# Patient Record
Sex: MALE | NOT HISPANIC OR LATINO | Employment: STUDENT | URBAN - METROPOLITAN AREA
[De-identification: names, ages, dates, MRNs, and addresses within clinical notes are randomized per-mention and may not be internally consistent; named-entity substitution may affect disease eponyms.]

---

## 2023-03-01 ENCOUNTER — HOSPITAL ENCOUNTER (OUTPATIENT)
Facility: AMBULATORY SURGERY CENTER | Age: 28
End: 2023-03-01
Attending: INTERNAL MEDICINE
Payer: COMMERCIAL

## 2023-03-01 ENCOUNTER — TELEPHONE (OUTPATIENT)
Dept: GASTROENTEROLOGY | Facility: CLINIC | Age: 28
End: 2023-03-01
Payer: COMMERCIAL

## 2023-03-01 ENCOUNTER — TRANSCRIBE ORDERS (OUTPATIENT)
Dept: OTHER | Age: 28
End: 2023-03-01

## 2023-03-01 DIAGNOSIS — K63.5 COLON POLYP: Primary | ICD-10-CM

## 2023-03-01 NOTE — TELEPHONE ENCOUNTER
Screening Questions  BLUE  KIND OF PREP RED  LOCATION [review exclusion criteria] GREEN  SEDATION TYPE        Y Are you active on mychart?       Amarilis Nieto   Ordering/Referring Provider?        [employer] What type of coverage do you have?      N Have you had a positive covid test in the last 14 days?     26.3 1. BMI  [BMI 40+ - review exclusion criteria]    Y  2. Are you able to give consent for your medical care? [IF NO,RN REVIEW]          N  3. Are you taking any prescription pain medications on a routine schedule   (ex narcotics: oxycodone, roxicodone, oxycontin,  and percocet)? [RN Review]          3a. EXTENDED PREP What kind of prescription?     N 4. Do you have any chemical dependencies such as alcohol, street drugs, or methadone?        **If yes 3- 5 , please schedule with MAC sedation.**          IF YES TO ANY 6 - 10 - HOSPITAL SETTING ONLY.     N 6.   Do you need assistance transferring?     N 7.   Have you had a heart or lung transplant?    N 8.   Are you currently on dialysis?   N 9.   Do you use daily home oxygen?   N 10. Do you take nitroglycerin?   10a.  If yes, how often?     11. [FEMALES]  N/Z Are you currently pregnant?    11a.  If yes, how many weeks? [ Greater than 12 weeks, OR NEEDED]    N 12. Do you have Pulmonary Hypertension? *NEED PAC APPT AT [facility] w/ MAC*     N 13. [review exclusion criteria]  Do you have any implantable devices in your body (pacemaker, defib, LVAD)?    N 14. In the past 6 months, have you had any heart related issues including cardiomyopathy or heart attack?     14a.  If yes, did it require cardiac stenting if so when?     N 15. Have you had a stroke or Transient ischemic attack (TIA - aka  mini stroke ) within 6 months?      N 16. Do you have mod to severe Obstructive Sleep Apnea?  [Hospital only]    N 17. Do you have SEVERE AND UNCONTROLLED asthma? *NEED PAC APPT AT [facility] w/MAC*     18. Are you currently taking any blood thinners?     18a. No. Continue to  "19.   18b.     N 19. Do you take the medication Phentermine?    19a. If yes, \"Hold for 7 days before procedure.  Please consult your prescribing provider if you have questions about holding this medication.\"     N  20. Do you have chronic kidney disease?      N  21. Do you have a diagnosis of diabetes?     N  22. On a regular basis do you go 3-5 days between bowel movements?      23. Preferred LOCAL Pharmacy for Pre Prescription    [ LIST ONLY ONE PHARMACY]       [facility] - [address] [place] [phone]      - CLOSING REMINDERS -    Informed patient they will need an adult    Cannot take any type of public or medical transportation alone    Conscious Sedation- Needs  for 6 hours after the procedure       MAC/General-Needs  for 24 hours after procedure    Pre-Procedure Covid test to be completed [[facility] PCR Testing Required]    Confirmed Nurse will call to complete assessment       - SCHEDULING DETAILS -  NO Hospital Setting Required? If yes, what is the exclusion?:    VISKOCIL  Surgeon    5/2  Date of Procedure  Lower Endoscopy [Colonoscopy]  Type of Procedure Scheduled  [facility] [place] [facility]   [facility]-If you answer yes to questions #8, #20, #21Which Colonoscopy Prep was Sent?     MAC, OUTSIDE ORDER Sedation Type     N PAC / Pre-op Required         Referral is external order, should be scheduled with MAC. Patient states that he is supposed to have an EGD and Colonoscopy completed, we only have referral for Colonoscopy. For now only Colonoscopy scheduled for 60mins to accommodate EGD. Instructed patient to connect with ordering provider for EGD referral. Informed patient that if EGD reafferal is not received only Colonoscopy will be completed. Patient expressed understanding.        "

## 2023-03-06 ENCOUNTER — TRANSCRIBE ORDERS (OUTPATIENT)
Dept: OTHER | Age: 28
End: 2023-03-06

## 2023-03-06 ENCOUNTER — TELEPHONE (OUTPATIENT)
Dept: GASTROENTEROLOGY | Facility: CLINIC | Age: 28
End: 2023-03-06
Payer: COMMERCIAL

## 2023-03-06 DIAGNOSIS — K21.9 GASTROESOPHAGEAL REFLUX DISEASE WITHOUT ESOPHAGITIS: Primary | ICD-10-CM

## 2023-03-06 NOTE — TELEPHONE ENCOUNTER
Screening questions done 3/1/23 at the time of the colonoscopy scheduling.  Just adding EGD to the colonoscopy that is already scheduled    - SCHEDULING DETAILS -  N Hospital Setting Required? If yes, what is the exclusion?: LIZETTE Henao  Surgeon    5/2/23  Date of Procedure  Upper and Lower Endoscopy [EGD and Colonoscopy]  Type of Procedure Scheduled  [facility]-If you answer yes to questions #8, #20, #21Which Colonoscopy Prep was Sent?     MAC Sedation Type     NO PAC / Pre-op Required

## 2023-04-04 ENCOUNTER — TELEPHONE (OUTPATIENT)
Dept: GASTROENTEROLOGY | Facility: CLINIC | Age: 28
End: 2023-04-04

## 2023-04-04 ENCOUNTER — TELEPHONE (OUTPATIENT)
Dept: GASTROENTEROLOGY | Facility: CLINIC | Age: 28
End: 2023-04-04
Payer: COMMERCIAL

## 2023-04-04 NOTE — TELEPHONE ENCOUNTER
Caller: Adriel Garcia    Reason for Reschedule/Cancellation (please be detailed, any staff messages or encounters to note?): Patient has finals on original scheduled date, reached out to reschedule for sooner date.      Prior to reschedule please review:    Ordering Provider:Amarilis Nieto    Sedation per order:MAC    Does patient have any ASC Exclusions, please identify?: NO      Notes on Cancelled Procedure:    Procedure:Upper and Lower Endoscopy [EGD and Colonoscopy]     Date: 5/2    [facility] [address] [place] [address], [address] Floor, [place] [address]    Surgeon: JOHANNA        Rescheduled: Yes    Procedure: Upper and Lower Endoscopy [EGD and Colonoscopy]     Date: 4/11    Location:[facility]; [address] [place] [address], [address]nd Floor, [place] [address]    Surgeon: GEORGE    Sedation Level Scheduled  MAC,  Reason for Sedation Level PER ORDER    Prep/Instructions updated and sent: LETTER

## 2023-04-04 NOTE — TELEPHONE ENCOUNTER
Called the Pt to discuss below. No answer, LM.     Patient scheduled for Colonoscopy/Upper endoscopy (EGD) on 4/11/23.     Discuss Covid policy.     Pre op exam needed? N/A    Arrival time: 1315. Procedure time 1400    Facility location: [facility]; [address], [place] [address]    Sedation type: MAC    Anticoagulations? No -- verify as we have limited information in chart    Electronic implanted devices? No    Diabetic? No    Indication for procedure: GERD, previous polyps     Bowel prep recommendation: Miralax prep without magnesium citrate    Prep instructions sent via Letter -- will check with the Pt is email option is ok as well.     Pre visit planning completed.    Landy Martinez RN  Endoscopy Procedure Pre Assessment RN

## 2023-04-10 ENCOUNTER — ANESTHESIA EVENT (OUTPATIENT)
Dept: SURGERY | Facility: AMBULATORY SURGERY CENTER | Age: 28
End: 2023-04-10
Payer: COMMERCIAL

## 2023-04-11 ENCOUNTER — HOSPITAL ENCOUNTER (OUTPATIENT)
Facility: AMBULATORY SURGERY CENTER | Age: 28
Discharge: HOME | End: 2023-04-11
Attending: INTERNAL MEDICINE
Payer: COMMERCIAL

## 2023-04-11 ENCOUNTER — ANESTHESIA (OUTPATIENT)
Dept: SURGERY | Facility: AMBULATORY SURGERY CENTER | Age: 28
End: 2023-04-11
Payer: COMMERCIAL

## 2023-04-11 VITALS
DIASTOLIC BLOOD PRESSURE: 88 MMHG | TEMPERATURE: 97.1 F | SYSTOLIC BLOOD PRESSURE: 158 MMHG | OXYGEN SATURATION: 99 % | RESPIRATION RATE: 16 BRPM

## 2023-04-11 VITALS — HEART RATE: 71 BPM

## 2023-04-11 LAB
COLONOSCOPY: NORMAL
UPPER GI ENDOSCOPY: NORMAL

## 2023-04-11 PROCEDURE — G8907 PT DOC NO EVENTS ON DISCHARG: HCPCS

## 2023-04-11 PROCEDURE — 45378 DIAGNOSTIC COLONOSCOPY: CPT

## 2023-04-11 PROCEDURE — G8918 PT W/O PREOP ORDER IV AB PRO: HCPCS

## 2023-04-11 PROCEDURE — 88305 TISSUE EXAM BY PATHOLOGIST: CPT | Performed by: PATHOLOGY

## 2023-04-11 PROCEDURE — 43239 EGD BIOPSY SINGLE/MULTIPLE: CPT

## 2023-04-11 RX ORDER — ONDANSETRON 2 MG/ML
4 INJECTION INTRAMUSCULAR; INTRAVENOUS EVERY 6 HOURS PRN
Status: DISCONTINUED | OUTPATIENT
Start: 2023-04-11 | End: 2023-04-12 | Stop reason: HOSPADM

## 2023-04-11 RX ORDER — NALOXONE HYDROCHLORIDE 0.4 MG/ML
0.2 INJECTION, SOLUTION INTRAMUSCULAR; INTRAVENOUS; SUBCUTANEOUS
Status: DISCONTINUED | OUTPATIENT
Start: 2023-04-11 | End: 2023-04-12 | Stop reason: HOSPADM

## 2023-04-11 RX ORDER — PROPOFOL 10 MG/ML
INJECTION, EMULSION INTRAVENOUS CONTINUOUS PRN
Status: DISCONTINUED | OUTPATIENT
Start: 2023-04-11 | End: 2023-04-11

## 2023-04-11 RX ORDER — ONDANSETRON 2 MG/ML
4 INJECTION INTRAMUSCULAR; INTRAVENOUS
Status: DISCONTINUED | OUTPATIENT
Start: 2023-04-11 | End: 2023-04-12 | Stop reason: HOSPADM

## 2023-04-11 RX ORDER — SODIUM CHLORIDE, SODIUM LACTATE, POTASSIUM CHLORIDE, CALCIUM CHLORIDE 600; 310; 30; 20 MG/100ML; MG/100ML; MG/100ML; MG/100ML
INJECTION, SOLUTION INTRAVENOUS CONTINUOUS PRN
Status: DISCONTINUED | OUTPATIENT
Start: 2023-04-11 | End: 2023-04-11

## 2023-04-11 RX ORDER — ONDANSETRON 4 MG/1
4 TABLET, ORALLY DISINTEGRATING ORAL EVERY 6 HOURS PRN
Status: DISCONTINUED | OUTPATIENT
Start: 2023-04-11 | End: 2023-04-12 | Stop reason: HOSPADM

## 2023-04-11 RX ORDER — FLUMAZENIL 0.1 MG/ML
0.2 INJECTION, SOLUTION INTRAVENOUS
Status: DISCONTINUED | OUTPATIENT
Start: 2023-04-11 | End: 2023-04-12 | Stop reason: HOSPADM

## 2023-04-11 RX ORDER — PROCHLORPERAZINE MALEATE 10 MG
10 TABLET ORAL EVERY 6 HOURS PRN
Status: DISCONTINUED | OUTPATIENT
Start: 2023-04-11 | End: 2023-04-12 | Stop reason: HOSPADM

## 2023-04-11 RX ORDER — LIDOCAINE 40 MG/G
CREAM TOPICAL
Status: DISCONTINUED | OUTPATIENT
Start: 2023-04-11 | End: 2023-04-12 | Stop reason: HOSPADM

## 2023-04-11 RX ORDER — NALOXONE HYDROCHLORIDE 0.4 MG/ML
0.4 INJECTION, SOLUTION INTRAMUSCULAR; INTRAVENOUS; SUBCUTANEOUS
Status: DISCONTINUED | OUTPATIENT
Start: 2023-04-11 | End: 2023-04-12 | Stop reason: HOSPADM

## 2023-04-11 RX ORDER — LIDOCAINE HYDROCHLORIDE 20 MG/ML
INJECTION, SOLUTION INFILTRATION; PERINEURAL PRN
Status: DISCONTINUED | OUTPATIENT
Start: 2023-04-11 | End: 2023-04-11

## 2023-04-11 RX ADMIN — PROPOFOL 150 MCG/KG/MIN: 10 INJECTION, EMULSION INTRAVENOUS at 15:08

## 2023-04-11 RX ADMIN — PROPOFOL 100 MG: 10 INJECTION, EMULSION INTRAVENOUS at 15:07

## 2023-04-11 RX ADMIN — SODIUM CHLORIDE, SODIUM LACTATE, POTASSIUM CHLORIDE, CALCIUM CHLORIDE: 600; 310; 30; 20 INJECTION, SOLUTION INTRAVENOUS at 14:59

## 2023-04-11 RX ADMIN — LIDOCAINE HYDROCHLORIDE 80 MG: 20 INJECTION, SOLUTION INFILTRATION; PERINEURAL at 15:08

## 2023-04-11 RX ADMIN — PROPOFOL 50 MG: 10 INJECTION, EMULSION INTRAVENOUS at 15:11

## 2023-04-11 NOTE — ANESTHESIA PREPROCEDURE EVALUATION
Anesthesia Pre-Procedure Evaluation    Patient: Adriel Garcia   MRN: [id number] : 1995        Procedure : Procedure(s):  Combined Esophagoscopy, Gastroscopy, Duodenoscopy (Egd) With Co2 Insufflation  Colonoscopy with CO2 insufflation          No past medical history on file.   No past surgical history on file.   Not on File   Social History     Tobacco Use     Smoking status: Not on file     Smokeless tobacco: Not on file   Substance Use Topics     Alcohol use: Not on file      Wt Readings from Last 1 Encounters:   No data found for Wt        Anesthesia Evaluation            ROS/MED HX  ENT/Pulmonary:  - neg pulmonary ROS     Neurologic:  - neg neurologic ROS     Cardiovascular:  - neg cardiovascular ROS  (-) murmur   METS/Exercise Tolerance: >4 METS    Hematologic:  - neg hematologic  ROS     Musculoskeletal:  - neg musculoskeletal ROS     GI/Hepatic:     (+) GERD, Symptomatic, bowel prep,     Renal/Genitourinary:  - neg Renal ROS     Endo:  - neg endo ROS     Psychiatric/Substance Use:  - neg psychiatric ROS     Infectious Disease:  - neg infectious disease ROS     Malignancy:  - neg malignancy ROS     Other:  - neg other ROS          Physical Exam    Airway        Mallampati: I   TM distance: > 3 FB   Neck ROM: full   Mouth opening: > 3 cm    Respiratory Devices and Support         Dental     Comment: Teeth examined without any significant abnormality, patient denies loose, missing or chipped teeth or anything removable.         Cardiovascular          Rhythm and rate: regular and normal (-) no murmur    Pulmonary   pulmonary exam normal        breath sounds clear to auscultation           OUTSIDE LABS:  CBC: No results found for: WBC, HGB, HCT, PLT  BMP: No results found for: NA, POTASSIUM, CHLORIDE, CO2, BUN, CR, GLC  COAGS: No results found for: PTT, INR, FIBR  POC: No results found for: BGM, HCG, HCGS  HEPATIC: No results found for: ALBUMIN, PROTTOTAL, ALT, AST, GGT, ALKPHOS, BILITOTAL, BILIDIRECT,  MARK  OTHER: No results found for: PH, LACT, A1C, RADHA, PHOS, MAG, LIPASE, AMYLASE, TSH, T4, T3, CRP, SED    Anesthesia Plan    ASA Status:  2   NPO Status:  NPO Appropriate    Anesthesia Type: MAC.     - Reason for MAC: immobility needed   Induction: Intravenous, Propofol.   Maintenance: TIVA.        Consents    Anesthesia Plan(s) and associated risks, benefits, and realistic alternatives discussed. Questions answered and patient/representative(s) expressed understanding.    - Discussed:     - Discussed with:  Patient      - Extended Intubation/Ventilatory Support Discussed: No.      - Patient is DNR/DNI Status: No    Use of blood products discussed: No .     Postoperative Care    Pain management: IV analgesics.   PONV prophylaxis: Ondansetron (or other 5HT-3), Background Propofol Infusion     Comments:    Other Comments: Discussed plan for IV anesthetic with native airway. Discussed potential need for conversion to general anesthetic with airway management and potential for recall.  Appropriately NPO, discussed with GERD symptoms that aspiration risk is increased with any anesthetic but still should be low risk overall.             Bethel Reynolds MD

## 2023-04-11 NOTE — H&P
[facility] Anesthesia Pre-op History and Physical    Adriel Garcia MRN# [id number]   Age: 27 year old YOB: 1995            Date of Exam 4/11/2023           Primary care provider: Cece Porter         Chief Complaint and/or Reason for Procedure:     GERD, dysphagia, history of colon polyps         Active problem list:     There are no problems to display for this patient.           Medications (include herbals and vitamins):   Any Plavix use in the last 7 days? No     No current outpatient medications on file.     Current Facility-Administered Medications   Medication     lidocaine (LMX4) kit     lidocaine 1 % 0.1-1 mL     ondansetron (ZOFRAN) injection 4 mg     sodium chloride (PF) 0.9% PF flush 3 mL     sodium chloride (PF) 0.9% PF flush 3 mL     Facility-Administered Medications Ordered in Other Encounters   Medication     lactated ringers infusion             Allergies:    No Known Allergies  Allergy to Latex? No  Allergy to tape?   No  Intolerances:             Physical Exam:   All vitals have been reviewed  Patient Vitals for the past 8 hrs:   BP Temp Temp src Resp SpO2   04/11/23 1331 (!) 148/89 97.1  F (36.2  C) Temporal 16 99 %     No intake/output data recorded.  Lungs:   No increased work of breathing, good air exchange, clear to auscultation bilaterally, no crackles or wheezing     Cardiovascular:   normal S1 and S2             Lab / Radiology Results:            Anesthetic risk and/or ASA classification:     2  Lynn Machuca DO

## 2023-04-11 NOTE — ANESTHESIA POSTPROCEDURE EVALUATION
Patient: Adriel Garcia    Procedure: Procedure(s):  Combined Esophagoscopy, Gastroscopy, Duodenoscopy (Egd) With Co2 Insufflation  Colonoscopy with CO2 insufflation  ESOPHAGOGASTRODUODENOSCOPY, WITH BIOPSY       Anesthesia Type:  MAC    Note:  Disposition: Outpatient   Postop Pain Control: Uneventful            Sign Out: Well controlled pain   PONV: No   Neuro/Psych: Uneventful            Sign Out: Acceptable/Baseline neuro status   Airway/Respiratory: Uneventful            Sign Out: Acceptable/Baseline resp. status   CV/Hemodynamics: Uneventful            Sign Out: Acceptable CV status; No obvious hypovolemia; No obvious fluid overload   Other NRE:    DID A NON-ROUTINE EVENT OCCUR? No           Last vitals:  Vitals Value Taken Time   /73 04/11/23 1548   Temp     Pulse     Resp 16 04/11/23 1548   SpO2 100 % 04/11/23 1548       Electronically Signed By: Bethel Reynolds MD  April 11, 2023  4:03 PM

## 2023-04-11 NOTE — ANESTHESIA CARE TRANSFER NOTE
Patient: Adriel Garcia    Procedure: Procedure(s):  Combined Esophagoscopy, Gastroscopy, Duodenoscopy (Egd) With Co2 Insufflation  Colonoscopy with CO2 insufflation  ESOPHAGOGASTRODUODENOSCOPY, WITH BIOPSY       Diagnosis: Colon polyp [K63.5]  Gastroesophageal reflux disease without esophagitis [K21.9]  Diagnosis Additional Information: No value filed.    Anesthesia Type:   MAC     Note:    Oropharynx: oropharynx clear of all foreign objects and spontaneously breathing  Level of Consciousness: drowsy  Oxygen Supplementation: room air    Independent Airway: airway patency satisfactory and stable  Dentition: dentition unchanged  Vital Signs Stable: post-procedure vital signs reviewed and stable  Report to RN Given: handoff report given  Patient transferred to: Phase II    Handoff Report: Identifed the Patient, Identified the Reponsible Provider, Reviewed the pertinent medical history, Discussed the surgical course, Reviewed Intra-OP anesthesia mangement and issues during anesthesia, Set expectations for post-procedure period and Allowed opportunity for questions and acknowledgement of understanding      Vitals:  Vitals Value Taken Time   BP     Temp     Pulse 71 04/11/23 1544   Resp     SpO2         Electronically Signed By: LUIS Marin CRNA  April 11, 2023  3:46 PM

## 2023-04-13 LAB
PATH REPORT.COMMENTS IMP SPEC: NORMAL
PATH REPORT.COMMENTS IMP SPEC: NORMAL
PATH REPORT.FINAL DX SPEC: NORMAL
PATH REPORT.GROSS SPEC: NORMAL
PATH REPORT.MICROSCOPIC SPEC OTHER STN: NORMAL
PATH REPORT.RELEVANT HX SPEC: NORMAL
PHOTO IMAGE: NORMAL

## 2023-05-21 ENCOUNTER — HEALTH MAINTENANCE LETTER (OUTPATIENT)
Age: 28
End: 2023-05-21

## 2024-02-22 ENCOUNTER — MEDICAL CORRESPONDENCE (OUTPATIENT)
Dept: SCHEDULING | Facility: CLINIC | Age: 29
End: 2024-02-22
Payer: COMMERCIAL

## 2024-02-22 ENCOUNTER — ANCILLARY ORDERS (OUTPATIENT)
Dept: CARDIOLOGY | Facility: CLINIC | Age: 29
End: 2024-02-22
Payer: COMMERCIAL

## 2024-02-22 ENCOUNTER — TRANSCRIBE ORDERS (OUTPATIENT)
Dept: OTHER | Age: 29
End: 2024-02-22

## 2024-02-22 DIAGNOSIS — R00.2 PALPITATIONS: Primary | ICD-10-CM

## 2024-02-22 DIAGNOSIS — D48.5 NEOPLASM OF UNCERTAIN BEHAVIOR OF SKIN: Primary | ICD-10-CM

## 2024-07-28 ENCOUNTER — HEALTH MAINTENANCE LETTER (OUTPATIENT)
Age: 29
End: 2024-07-28

## 2024-09-17 ENCOUNTER — TRANSFERRED RECORDS (OUTPATIENT)
Dept: HEALTH INFORMATION MANAGEMENT | Facility: CLINIC | Age: 29
End: 2024-09-17

## 2024-09-18 ENCOUNTER — TRANSFERRED RECORDS (OUTPATIENT)
Dept: HEALTH INFORMATION MANAGEMENT | Facility: CLINIC | Age: 29
End: 2024-09-18

## 2024-09-18 ENCOUNTER — TRANSCRIBE ORDERS (OUTPATIENT)
Dept: OTHER | Age: 29
End: 2024-09-18

## 2024-09-18 DIAGNOSIS — R25.3 MUSCLE TWITCHING: Primary | ICD-10-CM

## 2024-10-16 ENCOUNTER — ANCILLARY PROCEDURE (OUTPATIENT)
Dept: MRI IMAGING | Facility: CLINIC | Age: 29
End: 2024-10-16
Attending: FAMILY MEDICINE
Payer: COMMERCIAL

## 2024-10-16 DIAGNOSIS — M25.561 CHRONIC PAIN OF RIGHT KNEE: ICD-10-CM

## 2024-10-16 DIAGNOSIS — G89.29 CHRONIC PAIN OF RIGHT KNEE: ICD-10-CM

## 2024-10-16 PROCEDURE — 73721 MRI JNT OF LWR EXTRE W/O DYE: CPT | Mod: RT | Performed by: RADIOLOGY

## 2024-10-17 ENCOUNTER — TRANSCRIBE ORDERS (OUTPATIENT)
Dept: OTHER | Age: 29
End: 2024-10-17

## 2024-10-17 DIAGNOSIS — S83.241A TEAR OF MEDIAL MENISCUS OF RIGHT KNEE: Primary | ICD-10-CM

## 2024-10-28 ENCOUNTER — OFFICE VISIT (OUTPATIENT)
Dept: DERMATOLOGY | Facility: CLINIC | Age: 29
End: 2024-10-28
Attending: FAMILY MEDICINE
Payer: COMMERCIAL

## 2024-10-28 DIAGNOSIS — L21.9 DERMATITIS, SEBORRHEIC: ICD-10-CM

## 2024-10-28 DIAGNOSIS — L81.4 LENTIGINES: ICD-10-CM

## 2024-10-28 DIAGNOSIS — D22.9 MULTIPLE NEVI: ICD-10-CM

## 2024-10-28 DIAGNOSIS — L20.84 INTRINSIC ATOPIC DERMATITIS: ICD-10-CM

## 2024-10-28 DIAGNOSIS — Z12.83 ENCOUNTER FOR SCREENING FOR MALIGNANT NEOPLASM OF SKIN: Primary | ICD-10-CM

## 2024-10-28 PROCEDURE — 99204 OFFICE O/P NEW MOD 45 MIN: CPT | Performed by: PHYSICIAN ASSISTANT

## 2024-10-28 RX ORDER — FLUOXETINE 10 MG/1
20 CAPSULE ORAL DAILY
COMMUNITY
Start: 2024-10-23

## 2024-10-28 RX ORDER — KETOCONAZOLE 20 MG/ML
SHAMPOO, SUSPENSION TOPICAL
Qty: 100 ML | Refills: 11 | Status: SHIPPED | OUTPATIENT
Start: 2024-10-28

## 2024-10-28 RX ORDER — METHYLPHENIDATE HYDROCHLORIDE 36 MG/1
36 TABLET ORAL EVERY MORNING
COMMUNITY
Start: 2024-10-23

## 2024-10-28 RX ORDER — TRIAMCINOLONE ACETONIDE 1 MG/G
OINTMENT TOPICAL
Qty: 30 G | Refills: 4 | Status: SHIPPED | OUTPATIENT
Start: 2024-10-28

## 2024-10-28 ASSESSMENT — PAIN SCALES - GENERAL: PAINLEVEL_OUTOF10: NO PAIN (0)

## 2024-10-28 NOTE — PROGRESS NOTES
[facility] Dermatology Note  Encounter Date: Oct 28, 2024  Office Visit     Reviewed patients past medical history and pertinent chart review prior to patients visit today.     Dermatology Problem List:  # Seborrheic dermatitis  - ketoconazole 2% shampoo  # Atopic dermatitis  - triamcinolone 0.1% ointment     No personal history of skin cancer.  Family history of skin cancer, father, unknown type  ____________________________________________    Assessment & Plan:     # Seborrheic dermatitis, scalp   - Start ketoconazole 2% shampoo three times weekly. Advised to lather in the shower, waiting 5-10 minutes before rinsing.     # Atopic dermatitis  - Start triamcinolone 0.1% ointment twice daily for 2 weeks. Restart if rash flares again in the future. We reviewed potential side effects, including skin atrophy.  - Continue sensitive skin care with daily non-scented moisturizer.     # Perioral sores  - No present today. Discussed differential including acne versus cold sores vs other. I recommend photos / follow up should the lesions flare again in the future.     # Multiple nevi, trunk and extremities  # Solar lentigines  - No concerning features on dermoscopy. We discussed the importance of self exams at home. ABCDE criteria and importance of photoprotection reviewed.     Follow-up:  Annual for follow up full body skin exam, as needed for new or changing lesions or new concerns    All risks, benefits and alternatives were discussed with patient.  Patient is in agreement and understands the assessment and plan.  All questions were answered.  Giuliana Mendieta PA-C  [facility] Dermatology    ____________________________________________    CC: Derm Problem (FBSE - various spots on back)    HPI:  Mr. Adriel Garcia is a(n) 29 year old male who presents today as a return patient for a full body skin cancer screening. The patient requests evaluation of moles on his back. No growth or changes over time.  No pain, itching, or bleeding. Second, he notes a history of eczema on the hands and elbows. He would like a refill of his triamcinolone. Finally, he notes occasional sores around his mouth. He was prescribed a pill for this in the past, unsure of the name. No other specific cutaneous concerns today. The patient reports trying to be diligent with photoprotection.      Physical Exam:  Vitals: There were no vitals taken for this visit.  SKIN: Total skin excluding the genitalia areas was performed. The exam included the scalp, face, neck, bilateral arms, chest, back, abdomen, bilateral legs, digits, mons pubis, buttocks, and nails.   - Carmen II.  - Greasy yellow scale with background erythema involving the .   - Multiple tan/brown macules and papules scattered throughout exam, consistent with benign nevi. No concerning features on dermoscopy.   - Scattered tan, homogenous macules scattered on sun exposed skin, consistent with solar lentigines.     Medications:  Current Outpatient Medications   Medication Sig Dispense Refill    FLUoxetine (PROZAC) 10 MG capsule Take 20 mg by mouth daily.      methylphenidate HCl ER, OSM, (CONCERTA) 36 MG CR tablet Take 36 mg by mouth every morning.       No current facility-administered medications for this visit.      Past Medical History:   There is no problem list on file for this patient.    History reviewed. No pertinent past medical history.    CC Cece Porter MD  [address] [address] on close of this encounter.

## 2024-10-28 NOTE — NURSING NOTE
Dermatology Rooming Note    Adriel Garcia's goals for this visit include:   Chief Complaint   Patient presents with    Derm Problem     FBSE - various spots on back     Cece Aguiar, EMT

## 2024-10-28 NOTE — LETTER
10/28/2024       RE: Adriel Garcia  [address]  [place] [address]     Dear Colleague,    Thank you for referring your patient, Adriel Garcia, to the [facility] [place] at [facility]. Please see a copy of my visit note below.    [facility] Dermatology Note  Encounter Date: Oct 28, 2024  Office Visit     Reviewed patients past medical history and pertinent chart review prior to patients visit today.     Dermatology Problem List:  # Seborrheic dermatitis  - ketoconazole 2% shampoo  # Atopic dermatitis  - triamcinolone 0.1% ointment     No personal history of skin cancer.  Family history of skin cancer, father, unknown type  ____________________________________________    Assessment & Plan:     # Seborrheic dermatitis, scalp   - Start ketoconazole 2% shampoo three times weekly. Advised to lather in the shower, waiting 5-10 minutes before rinsing.     # Atopic dermatitis  - Start triamcinolone 0.1% ointment twice daily for 2 weeks. Restart if rash flares again in the future. We reviewed potential side effects, including skin atrophy.  - Continue sensitive skin care with daily non-scented moisturizer.     # Perioral sores  - No present today. Discussed differential including acne versus cold sores vs other. I recommend photos / follow up should the lesions flare again in the future.     # Multiple nevi, trunk and extremities  # Solar lentigines  - No concerning features on dermoscopy. We discussed the importance of self exams at home. ABCDE criteria and importance of photoprotection reviewed.     Follow-up:  Annual for follow up full body skin exam, as needed for new or changing lesions or new concerns    All risks, benefits and alternatives were discussed with patient.  Patient is in agreement and understands the assessment and plan.  All questions were answered.  Giuliana Mendieta PA-C  [facility]  Dermatology    ____________________________________________    CC: Derm Problem (FBSE - various spots on back)    HPI:  Mr. Adriel Garcia is a(n) 29 year old male who presents today as a return patient for a full body skin cancer screening. The patient requests evaluation of moles on his back. No growth or changes over time. No pain, itching, or bleeding. Second, he notes a history of eczema on the hands and elbows. He would like a refill of his triamcinolone. Finally, he notes occasional sores around his mouth. He was prescribed a pill for this in the past, unsure of the name. No other specific cutaneous concerns today. The patient reports trying to be diligent with photoprotection.      Physical Exam:  Vitals: There were no vitals taken for this visit.  SKIN: Total skin excluding the genitalia areas was performed. The exam included the scalp, face, neck, bilateral arms, chest, back, abdomen, bilateral legs, digits, mons pubis, buttocks, and nails.   - Carmen II.  - Greasy yellow scale with background erythema involving the .   - Multiple tan/brown macules and papules scattered throughout exam, consistent with benign nevi. No concerning features on dermoscopy.   - Scattered tan, homogenous macules scattered on sun exposed skin, consistent with solar lentigines.     Medications:  Current Outpatient Medications   Medication Sig Dispense Refill     FLUoxetine (PROZAC) 10 MG capsule Take 20 mg by mouth daily.       methylphenidate HCl ER, OSM, (CONCERTA) 36 MG CR tablet Take 36 mg by mouth every morning.       No current facility-administered medications for this visit.      Past Medical History:   There is no problem list on file for this patient.    History reviewed. No pertinent past medical history.    CC Cece Porter MD  [address] [address] on close of this encounter.      Again, thank you for allowing me to participate in the care of your patient.       Sincerely,    Giuliana Mendieta PA-C

## 2024-10-28 NOTE — PATIENT INSTRUCTIONS
Patient Education        Proper skin care from [facility]:     -Eliminate harsh soaps as they strip the natural oils from the skin, often resulting in dry itchy skin ( i.e. Dial, Zest, [place])  -Use mild soaps such as Cetaphil or Dove Sensitive Skin in the shower. You do not need to use soap on arms, legs, and trunk every time you shower unless visibly soiled.   -Avoid hot or cold showers.  -After showering, lightly dry off and apply moisturizing within 2-3 minutes. This will help trap moisture in the skin.   -Aggressive use of a moisturizer at least 1-2 times a day to the entire body (including -Vanicream, Cetaphil, Aquaphor or Cerave) and moisturize hands after every washing.  -We recommend using moisturizers that come in a tub that needs to be scooped out, not a pump. This has more of an oil base. It will hold moisture in your skin much better than a water base moisturizer. The above recommended are non-pore clogging.        Wear a sunscreen with at least SPF 30 on your face, ears, neck and V of the chest daily. Wear sunscreen on other areas of the body if those areas are exposed to the sun throughout the day. Sunscreens can contain physical and/or chemical blockers. Physical blockers are less likely to clog pores, these include zinc oxide and titanium dioxide. Reapply every two hour and after swimming.      Sunscreen examples: https://www.ewg.org/sunscreen/     UV radiation  UVA radiation remains constant throughout the day and throughout the year. It is a longer wavelength than UVB and therefore penetrates deeper into the skin leading to immediate and delayed tanning, photoaging, and skin cancer. 70-80% of UVA and UVB radiation occurs between the hours of 10am-2pm.  UVB radiation  UVB radiation causes the most harmful effects and is more significant during the summer months. However, snow and ice can reflect UVB radiation leading to skin damage during the winter months as well. UVB radiation is  responsible for tanning, burning, inflammation, delayed erythema (pinkness), pigmentation (brown spots), and skin cancer.      I recommend self monthly full body exams and yearly full body exams with a dermatology provider. If you develop a new or changing lesion please follow up for examination. Most skin cancers are pink and scaly or pink and pearly. However, we do see blue/brown/black skin cancers.  Consider the ABCDEs of melanoma when giving yourself your monthly full body exam ( don't forget the groin, buttocks, feet, toes, etc). A-asymmetry, B-borders, C-color, D-diameter, E-elevation or evolving. If you see any of these changes please follow up in clinic. If you cannot see your back I recommend purchasing a hand held mirror to use with a larger wall mirror.       Checking for Skin Cancer  You can find cancer early by checking your skin each month. There are 3 kinds of skin cancer. They are melanoma, basal cell carcinoma, and squamous cell carcinoma. Doing monthly skin checks is the best way to find new marks or skin changes. Follow the instructions below for checking your skin.   The ABCDEs of checking moles for melanoma   Check your moles or growths for signs of melanoma using ABCDE:   Asymmetry: the sides of the mole or growth don t match  Border: the edges are ragged, notched, or blurred  Color: the color within the mole or growth varies  Diameter: the mole or growth is larger than 6 mm (size of a pencil eraser)  Evolving: the size, shape, or color of the mole or growth is changing (evolving is not shown in the images below)    Checking for other types of skin cancer  Basal cell carcinoma or squamous cell carcinoma have symptoms such as:      A spot or mole that looks different from all other marks on your skin  Changes in how an area feels, such as itching, tenderness, or pain  Changes in the skin's surface, such as oozing, bleeding, or scaliness  A sore that does not heal  New swelling or redness beyond  the border of a mole     Who s at risk?  Anyone can get skin cancer. But you are at greater risk if you have:   Fair skin, light-colored hair, or light-colored eyes  Many moles or abnormal moles on your skin  A history of sunburns from sunlight or tanning beds  A family history of skin cancer  A history of exposure to radiation or chemicals  A weakened immune system  If you have had skin cancer in the past, you are at risk for recurring skin cancer.   How to check your skin  Do your monthly skin checkups in front of a full-length mirror. Check all parts of your body, including your:   Head (ears, face, neck, and scalp)  Torso (front, back, and sides)  Arms (tops, undersides, upper, and lower armpits)  Hands (palms, backs, and fingers, including under the nails)  Buttocks and genitals  Legs (front, back, and sides)  Feet (tops, soles, toes, including under the nails, and between toes)  If you have a lot of moles, take digital photos of them each month. Make sure to take photos both up close and from a distance. These can help you see if any moles change over time.   Most skin changes are not cancer. But if you see any changes in your skin, call your doctor right away. Only he or she can diagnose a problem. If you have skin cancer, seeing your doctor can be the first step toward getting the treatment that could save your life.   [employer] last reviewed this educational content on 4/1/2019 2000-2020 [employer] [address] [place] [address], [place] [address]. All rights reserved. This information is not intended as a substitute for professional medical care. Always follow your healthcare professional's instructions.        When should I call my doctor?  If you are worsening or not improving, please, contact us or seek urgent care as noted below.      Who should I call with questions (adults)?  [facility] (adult and pediatric): [phone]  [facility]  [place] (adult): [phone]  [facility] ([place], [place], [place] and [place]) [phone]  For urgent needs outside of business hours call the [facility] at [phone] and ask for the dermatology resident on call to be paged  If this is a medical emergency and you are unable to reach an ER, Call 911        If you need a prescription refill, please contact your pharmacy. Refills are approved or denied by our Physicians during normal business hours, Monday through Fridays  Per office policy, refills will not be granted if you have not been seen within the past year (or sooner depending on your child's condition)

## 2024-11-07 ENCOUNTER — TELEPHONE (OUTPATIENT)
Dept: NEUROLOGY | Facility: CLINIC | Age: 29
End: 2024-11-07
Payer: COMMERCIAL

## 2024-11-07 NOTE — TELEPHONE ENCOUNTER
Romero Morel,    We have been trying to reach you regarding your upcoming appointment with Dr. Sanchez on November 26. Unfortunately this appointment will need to be cancelled and rescheduled as the provider will not be in clinic. Please call us at [phone] to reschedule.    We apologize for the inconvenience, your appointment with  Dr. Sanchez on 11-26-24 has been cancelled and needs to be rescheduled as the provider will not be in clinic.    Thank you,  Neurosciences Scheduling Team

## 2024-12-05 NOTE — TELEPHONE ENCOUNTER
Action 12/5/24 MV 3.55pm   Action Taken Records request faxed to [facility]  Called pt to see if he has neuro recs/imaging, pt stated he saw Dr. Alanis in [facility]. Pt has recs and will attach them to [facility]       RECORDS RECEIVED FROM: external   REASON FOR VISIT: New Movement disorder  Muscle twitching, tremors    PROVIDER: Dr. Panchal   DATE OF APPT: 1/31/25   NOTES (FOR ALL VISITS) STATUS DETAILS   OFFICE NOTE from referring provider In process Dr Cece Porter @ [facility]:     OFFICE NOTE from other specialist In process Dr Isiah Alanis @ [facility] [place] [place]:     EMG In process [facility] [place] [place]     MEDICATION LIST In process    IMAGING  (FOR ALL VISITS)     MRI (HEAD, NECK, SPINE) N/A    CT (HEAD, NECK, SPINE) N/A

## 2024-12-12 NOTE — PROGRESS NOTES
Department of Neurology  Movement Disorders Division   New Patient Visit    Patient: Adriel Garcia   MRN: [id number]   : 1995   Date of Visit: 2024    CC:    HPI:  Adriel Garcia is a 29 year old male with a history of ADHD who presents as a new movement disorders consult for muscle twitching.    He presents by himself.    He notes visible and tangible twitches in his L calf around . Since then, he has noticed similar twitches throughout his body that have not gotten better. The twitches are small and not associated with movement about a joint or abnormal posturing. He became very nervous that he has ALS; has seen a neurologist and underwent an EMG that was normal - was diagnosed with benign fasciculations. He was advised to stop Concerta as there was a thought that it may have been contributing to the condition; he tried and did not feel it made any difference for the symptoms but did make his mood and concentration worse so he restarted it. Of note, his MIL passed of pancreatic cancer in summer 2024.    He has good days and bad days. He finds that coffee can increase the twitching as well as stress/anxiety. He will often move his legs voluntarily to mitigate the sensation. There is no associated pain, can feel numbness/tingling in various parts of his body without a clear pattern. They do not obviously worsen with exercise. He has been exercising less due to an injured meniscus.    He endorses a sensation of tingling and discomfort in his legs at night that is improved by moving the legs. He was started on gabapentin for presumed restless legs; he takes it inconsistently.     He sleeps relatively well. He does notice that when he is waking up in the morning, fasciculations can be more prominent.    Mood is better, was struggling with anxiety partly related to health concerns. Is now on Prozac and feels this is helpful.    He endorses a mild tremor in L>R hand for the past year or so for which  he takes propranolol. He feels it is well-controlled and is not bothered by it. He has a sister with tremor.    Denies yannick muscle weakness, balance changes, diplopia.    Denies tobacco, drinks 1-3 EtOH drinks/week, occasional CBD.    He is finishing law school, has a job accepted at [employer] after graduation.     Lives at home with his wife.     ROS:  All others negative except as listed above.    No past medical history on file.     Past Surgical History:   Procedure Laterality Date    COLONOSCOPY WITH CO2 INSUFFLATION N/A 4/11/2023    Procedure: Colonoscopy with CO2 insufflation;  Surgeon: Lynn Machuca DO;  Location: [facility] OR    COMBINED ESOPHAGOSCOPY, GASTROSCOPY, DUODENOSCOPY (EGD) WITH CO2 INSUFFLATION N/A 4/11/2023    Procedure: Combined Esophagoscopy, Gastroscopy, Duodenoscopy (Egd) With Co2 Insufflation;  Surgeon: Lynn Machuca DO;  Location: [facility] OR    ESOPHAGOSCOPY, GASTROSCOPY, DUODENOSCOPY (EGD), COMBINED N/A 4/11/2023    Procedure: ESOPHAGOGASTRODUODENOSCOPY, WITH BIOPSY;  Surgeon: Lynn Machuca DO;  Location: [facility] OR        Current Outpatient Medications   Medication Sig Dispense Refill    FLUoxetine (PROZAC) 20 MG capsule Take 20 mg by mouth daily.      gabapentin (NEURONTIN) 300 MG capsule TAKE ONE Capsule BY MOUTH NIGHTLY AS NEEDED      ketoconazole (NIZORAL) 2 % external shampoo Apply topically three times a week. Lather in the shower, wait 3-5 minutes before rinsing. 100 mL 11    methylphenidate (RITALIN) 10 MG tablet TAKE ONE Tablet by MOUTH DAILY IN THE AFTERNOON AS NEEDED FOR CONCENTRATION.      methylphenidate HCl ER, OSM, (CONCERTA) 36 MG CR tablet Take 36 mg by mouth every morning.      propranolol ER (INDERAL LA) 80 MG 24 hr capsule Take 80 mg by mouth daily.      triamcinolone (KENALOG) 0.1 % external ointment Apply to rash twice daily for 2-4 weeks, then stop. Restart if rash flares in the future. Not for groin, underarms, or face. 30 g 4       No Known Allergies  "    Family History   Problem Relation Age of Onset    Skin Cancer Father     Tremor Sister         Social History     Socioeconomic History    Marital status: Single     Spouse name: Not on file    Number of children: Not on file    Years of education: Not on file    Highest education level: Not on file   Occupational History    Not on file   Tobacco Use    Smoking status: Never    Smokeless tobacco: Not on file   Substance and Sexual Activity    Alcohol use: Not on file    Drug use: Not on file    Sexual activity: Not on file   Other Topics Concern    Not on file   Social History Narrative    Not on file     Social Drivers of Health     Financial Resource Strain: Not on file   Food Insecurity: Not on file   Transportation Needs: Not on file   Physical Activity: Not on file   Stress: Not on file   Social Connections: Not on file   Interpersonal Safety: Not on file   Housing Stability: Not on file        PHYSICAL EXAM:  /84 (BP Location: Right arm, Patient Position: Sitting, Cuff Size: Adult Large)   Pulse 75   Resp 18   Ht 1.87 m (6' 1.62\")   Wt 97.9 kg (215 lb 12.8 oz)   SpO2 100%   BMI 27.99 kg/m      Mental Status:   Alert and oriented to person, place, time, and situation.  Speech fluent without paraphasic errors. Follows commands briskly.     CN:  II: Pupils equal, round, and reactive to light. VFF.  III, IV, VI: EOM normal range, no nystagmus.  Normal saccades.  V:  Facial sensation intact.  VII: Face symmetric at rest and with activation  VIII: Intact to finger rub bilaterally.  IX, X: Palate rise b/l, uvula midline.  No dysarthria.  XI: Trapezius and SCM 5/5 bilaterally.  XII: Tongue protrudes midline. No fasciculation or atrophy noted.    Motor:    Normal bulk and tone throughout upper and lower extremities.  At least one fasciculation observed in the R calf  No rigidity or bradykinesia.  Very slight high-frequency low-amplitude postural tremor L>R hand, worse wingbeating than outstretched, " slight kinetic component, no resting tremor.    R/L  Shoulder abd      5/5  Elbow flex  5/5  Elbow ext  5/5  Wrist flex  5/5  Wrist ext  5/5  Finger abduction 5/5  Thumb abduction 5/5  Finger extension 5/5    Hip flex  5/5  Knee flex  5/5  Knee ext  5/5  Dorsiflex  5/5  Plantar flex  5/5  Eversion  5/5  Inversion  5/5    Reflexes:  R/L  Biceps   2+/2+  Triceps  2+/2+  Patellar  2+/2+  Achilles  2+/2+  No clonus.  No frontal release signs.    Sensation:    Intact to LT, vibration, temperature in all extremities.    Coordination:    FTN and HTS intact bilaterally.    Gait:    Normal stance and stride. Tandem gait intact.  Able to walk on toes and heels.  Romberg negative.        12/13/2024    10:00 AM   Tremor Motor Scale   Assessment Time 10:45   Medication On   DBS - Right Brain None   DBS - Left Brain None   Head 0   Face & Jaw 0   Voice 0   Outstretched - RIGHT 0   Outstretched - LEFT 0.5   Wingbeating - RIGHT 0   Wingbeating - LEFT 1   Kinetic - RIGHT 0   Kinetic - LEFT 1   Lower Limb - RIGHT 0   Lower Limb - LEFT 0   Lower Limb (Max) 0   Spiral - RIGHT 0   Spiral - LEFT 0   Handwriting 0   Dot approx - RIGHT 0   Dot approx - LEFT 0.5   Trunk (Standing) 0   Total Right 0   Total Left 3   Axial 0   TOTAL 3           ASSESSMENT/PLAN:  Pt presents with several months of full-body painless twitches without movement about a joint and a normal neurologic exam with normal EMG. This presentation is most consistent with benign fasciculations. At this time, there is no role for additional diagnostic testing. There are no medications that are likely to specifically improve fasciculations, so he was counseled to reduce triggers and manage anxiety as much as possible. He was counseled that there is no role for serial EMG unless he develops other symptoms that might warrant a repeat evaluation, and that this should be done with a neuromuscular specialist.    His tremor is mild and well-controlled, so there is no role for  adjusting medications at this time. He may have essential tremor vs enhanced physiologic tremor given its frequency and amplitude, though propranolol may be masking some additional tremor features. He may return to movement disorders clinic if tremor worsens.     He was counseled to keep an eye on his blood pressure and glucose in the long tem.      Return to clinic in 12mo    Patient seen and discussed with attending neurologist Dr. Kd Panchal    60 minutes spent on date of encounter doing chart reviews, obtaining history, performing physical exam, counseling, documentation, and further activities as noted above.     Kd Cazares MD  Fellow  Movement Disorders Division  [facility]    Patient seen and examined with Dr. Ureña and I agree with the assessment and plan as outlined.    Kd Panchal PhD, MD

## 2024-12-13 ENCOUNTER — OFFICE VISIT (OUTPATIENT)
Dept: NEUROLOGY | Facility: CLINIC | Age: 29
End: 2024-12-13
Attending: FAMILY MEDICINE
Payer: COMMERCIAL

## 2024-12-13 VITALS
HEART RATE: 75 BPM | RESPIRATION RATE: 18 BRPM | BODY MASS INDEX: 27.7 KG/M2 | WEIGHT: 215.8 LBS | DIASTOLIC BLOOD PRESSURE: 84 MMHG | HEIGHT: 74 IN | SYSTOLIC BLOOD PRESSURE: 137 MMHG | OXYGEN SATURATION: 100 %

## 2024-12-13 DIAGNOSIS — R25.3 MUSCLE TWITCHING: ICD-10-CM

## 2024-12-13 PROCEDURE — 99205 OFFICE O/P NEW HI 60 MIN: CPT | Mod: GC | Performed by: INTERNAL MEDICINE

## 2024-12-13 RX ORDER — METHYLPHENIDATE HYDROCHLORIDE 10 MG/1
TABLET ORAL
COMMUNITY
Start: 2024-10-23

## 2024-12-13 RX ORDER — GABAPENTIN 300 MG/1
CAPSULE ORAL
COMMUNITY
Start: 2024-09-25

## 2024-12-13 RX ORDER — PROPRANOLOL HYDROCHLORIDE 80 MG/1
80 CAPSULE, EXTENDED RELEASE ORAL DAILY
COMMUNITY
Start: 2024-09-25

## 2024-12-13 ASSESSMENT — PAIN SCALES - GENERAL: PAINLEVEL_OUTOF10: NO PAIN (0)

## 2024-12-13 NOTE — NURSING NOTE
"Chief Complaint   Patient presents with    New Patient     /84 (BP Location: Right arm, Patient Position: Sitting, Cuff Size: Adult Large)   Pulse 75   Resp 18   Ht 1.87 m (6' 1.62\")   Wt 97.9 kg (215 lb 12.8 oz)   SpO2 100%   BMI 27.99 kg/m      BERTIN WESLEY    "

## 2024-12-13 NOTE — PATIENT INSTRUCTIONS
The most likely diagnosis for your muscle twitches remains benign fasciculations.    Your neurologic exam is normal and there is no concern from our end that you have a degenerative or otherwise worrisome neurologic disorder.    This does not mean that you will never develop any neurologic conditions, but at this time we have no evidence that you are at increased risk for developing anything in particular.     The best way to manage the twitching is to reduce the triggers that make them worse. There are no good medications to specifically make the twitching better.    Your tremor is very mild, so you can continue taking propranolol.

## 2025-01-31 ENCOUNTER — PRE VISIT (OUTPATIENT)
Dept: NEUROLOGY | Facility: CLINIC | Age: 30
End: 2025-01-31

## 2025-08-10 ENCOUNTER — HEALTH MAINTENANCE LETTER (OUTPATIENT)
Age: 30
End: 2025-08-10

## (undated) DEVICE — PAD CHUX UNDERPAD 23X24" 7136

## (undated) DEVICE — PREP CHLORAPREP 26ML TINTED ORANGE  260815

## (undated) DEVICE — KIT ENDO FIRST STEP DISINFECTANT 200ML W/POUCH EP-4